# Patient Record
Sex: FEMALE | Race: BLACK OR AFRICAN AMERICAN | NOT HISPANIC OR LATINO
[De-identification: names, ages, dates, MRNs, and addresses within clinical notes are randomized per-mention and may not be internally consistent; named-entity substitution may affect disease eponyms.]

---

## 2023-04-11 ENCOUNTER — P2P PATIENT RECORD (OUTPATIENT)
Age: 43
End: 2023-04-11

## 2023-04-24 ENCOUNTER — LAB OUTSIDE AN ENCOUNTER (OUTPATIENT)
Dept: URBAN - METROPOLITAN AREA CLINIC 27 | Facility: CLINIC | Age: 43
End: 2023-04-24

## 2023-04-24 ENCOUNTER — OFFICE VISIT (OUTPATIENT)
Dept: URBAN - METROPOLITAN AREA CLINIC 26 | Facility: CLINIC | Age: 43
End: 2023-04-24
Payer: COMMERCIAL

## 2023-04-24 ENCOUNTER — WEB ENCOUNTER (OUTPATIENT)
Dept: URBAN - METROPOLITAN AREA CLINIC 27 | Facility: CLINIC | Age: 43
End: 2023-04-24

## 2023-04-24 ENCOUNTER — OFFICE VISIT (OUTPATIENT)
Dept: URBAN - METROPOLITAN AREA CLINIC 27 | Facility: CLINIC | Age: 43
End: 2023-04-24
Payer: COMMERCIAL

## 2023-04-24 VITALS
HEART RATE: 83 BPM | SYSTOLIC BLOOD PRESSURE: 138 MMHG | DIASTOLIC BLOOD PRESSURE: 100 MMHG | BODY MASS INDEX: 28.99 KG/M2 | HEIGHT: 65 IN | WEIGHT: 174 LBS

## 2023-04-24 DIAGNOSIS — K76.89 LIVER LESION: ICD-10-CM

## 2023-04-24 DIAGNOSIS — K59.09 CONSTIPATION: ICD-10-CM

## 2023-04-24 DIAGNOSIS — R14.0 BLOATING: ICD-10-CM

## 2023-04-24 DIAGNOSIS — R10.10 UPPER ABDOMINAL PAIN: ICD-10-CM

## 2023-04-24 PROCEDURE — 83014 H PYLORI DRUG ADMIN: CPT | Performed by: INTERNAL MEDICINE

## 2023-04-24 PROCEDURE — 99204 OFFICE O/P NEW MOD 45 MIN: CPT | Performed by: INTERNAL MEDICINE

## 2023-04-24 PROCEDURE — 99244 OFF/OP CNSLTJ NEW/EST MOD 40: CPT | Performed by: INTERNAL MEDICINE

## 2023-04-24 PROCEDURE — 83013 H PYLORI (C-13) BREATH: CPT | Performed by: INTERNAL MEDICINE

## 2023-04-24 RX ORDER — PANTOPRAZOLE SODIUM 20 MG/1
1 TABLET TABLET, DELAYED RELEASE ORAL ONCE A DAY
Status: ACTIVE | COMMUNITY

## 2023-04-24 NOTE — PHYSICAL EXAM GASTROINTESTINAL
Abdomen is soft, mild TTP upper abdomen, nondistended, no guarding or rigidity, no masses palpable, normal bowel sounds; no tympany

## 2023-04-24 NOTE — HPI-TODAY'S VISIT:
Patient the request of Dr. Rios for evaluation of abdominal "pressure" and bloating that have been intermittently present over the past month or so.  She denies any trigger for the onset of her symptoms.  She states that the pressure is worse in the upper abdomen.  It waxes and wanes without specific trigger.  She states that the bloating occurs after nearly any p.o. intake.  She does not take a probiotic or use anti-gas medication.  She has been seen in the ER twice over the past month for the same symptoms (and shortness of breath); CT abdomen pelvis apparently showed gastritis, uterine fibroids, and a 1.3 cm lesion in the liver (subcapsular, right lobe--?adenoma vs hemangioma).  Liver enzymes, lipase, WBC and hemoglobin were normal.  She was given Bentyl and Protonix 20 mg, and discharged home.  She was sent here for further evaluation.  She has been taking the 20 mg Protonix twice daily.  She states that this medication has been very helpful for her reflux but has not helped the abdominal pressure and discomfort.  She tried the Bentyl once; this was unhelpful.  She denies any food sensitivities.  She has rare constipation.  She has not lost any weight.  She denies significant anxiety.  She has not had a prior colonoscopy but underwent an upper endoscopy approximately 10 years ago which apparently showed "a sore in my stomach"; she states that she was treated with Nexium and ?antibiotics though she does not think it was for H. pylori.  There is no family history of colon cancer or polyps but her mother had liver cancer.

## 2023-04-25 ENCOUNTER — TELEPHONE ENCOUNTER (OUTPATIENT)
Dept: URBAN - METROPOLITAN AREA CLINIC 35 | Facility: CLINIC | Age: 43
End: 2023-04-25

## 2023-04-25 RX ORDER — OMEPRAZOLE MAGNESIUM, AMOXICILLIN AND RIFABUTIN 10; 250; 12.5 MG/1; MG/1; MG/1
4 CAPSULES CAPSULE, DELAYED RELEASE ORAL
Qty: 168 | Refills: 0 | OUTPATIENT
Start: 2023-04-25 | End: 2023-05-09

## 2023-04-27 ENCOUNTER — TELEPHONE ENCOUNTER (OUTPATIENT)
Dept: URBAN - METROPOLITAN AREA CLINIC 27 | Facility: CLINIC | Age: 43
End: 2023-04-27

## 2023-04-27 ENCOUNTER — TELEPHONE ENCOUNTER (OUTPATIENT)
Dept: URBAN - METROPOLITAN AREA CLINIC 35 | Facility: CLINIC | Age: 43
End: 2023-04-27

## 2023-04-27 ENCOUNTER — OFFICE VISIT (OUTPATIENT)
Dept: URBAN - METROPOLITAN AREA MEDICAL CENTER 8 | Facility: MEDICAL CENTER | Age: 43
End: 2023-04-27
Payer: COMMERCIAL

## 2023-04-27 DIAGNOSIS — K31.89 ACQUIRED DEFORMITY OF DUODENUM: ICD-10-CM

## 2023-04-27 DIAGNOSIS — R10.10 UPPER ABDOMINAL PAIN: ICD-10-CM

## 2023-04-27 DIAGNOSIS — R14.0 ABDOMINAL BLOATING: ICD-10-CM

## 2023-04-27 PROCEDURE — 43235 EGD DIAGNOSTIC BRUSH WASH: CPT | Performed by: INTERNAL MEDICINE

## 2023-04-27 RX ORDER — OMEPRAZOLE MAGNESIUM, AMOXICILLIN AND RIFABUTIN 10; 250; 12.5 MG/1; MG/1; MG/1
4 CAPSULES CAPSULE, DELAYED RELEASE ORAL
Qty: 168 | Refills: 0 | Status: ACTIVE | COMMUNITY
Start: 2023-04-25 | End: 2023-05-09

## 2023-04-27 RX ORDER — PANTOPRAZOLE SODIUM 20 MG/1
1 TABLET TABLET, DELAYED RELEASE ORAL ONCE A DAY
Status: ACTIVE | COMMUNITY

## 2023-05-01 ENCOUNTER — TELEPHONE ENCOUNTER (OUTPATIENT)
Dept: URBAN - METROPOLITAN AREA CLINIC 27 | Facility: CLINIC | Age: 43
End: 2023-05-01

## 2023-05-01 RX ORDER — AMOXICILLIN 500 MG/1
2 CAPSULES CAPSULE ORAL TWICE A DAY
Qty: 56 CAPSULE | Refills: 0 | OUTPATIENT
Start: 2023-05-01 | End: 2023-05-15

## 2023-05-01 RX ORDER — LANSOPRAZOLE 30 MG/1
1 CAPSULE CAPSULE, DELAYED RELEASE ORAL ONCE A DAY
Qty: 28 CAPSULE | Refills: 0 | OUTPATIENT
Start: 2023-05-01

## 2023-05-01 RX ORDER — CLARITHROMYCIN 500 MG/1
1 TABLET TABLET, FILM COATED ORAL
Qty: 28 TABLET | Refills: 0 | OUTPATIENT
Start: 2023-05-01 | End: 2023-05-15

## 2023-05-04 ENCOUNTER — OFFICE VISIT (OUTPATIENT)
Dept: URBAN - METROPOLITAN AREA SURGERY CENTER 7 | Facility: SURGERY CENTER | Age: 43
End: 2023-05-04

## 2023-06-21 ENCOUNTER — WEB ENCOUNTER (OUTPATIENT)
Dept: URBAN - METROPOLITAN AREA CLINIC 27 | Facility: CLINIC | Age: 43
End: 2023-06-21

## 2023-06-21 ENCOUNTER — OFFICE VISIT (OUTPATIENT)
Dept: URBAN - METROPOLITAN AREA CLINIC 27 | Facility: CLINIC | Age: 43
End: 2023-06-21

## 2023-06-28 ENCOUNTER — DASHBOARD ENCOUNTERS (OUTPATIENT)
Age: 43
End: 2023-06-28

## 2023-06-28 ENCOUNTER — OFFICE VISIT (OUTPATIENT)
Dept: URBAN - METROPOLITAN AREA CLINIC 27 | Facility: CLINIC | Age: 43
End: 2023-06-28
Payer: COMMERCIAL

## 2023-06-28 VITALS
WEIGHT: 181.2 LBS | BODY MASS INDEX: 30.19 KG/M2 | DIASTOLIC BLOOD PRESSURE: 87 MMHG | SYSTOLIC BLOOD PRESSURE: 120 MMHG | HEART RATE: 98 BPM | HEIGHT: 65 IN

## 2023-06-28 DIAGNOSIS — R10.10 UPPER ABDOMINAL PAIN: ICD-10-CM

## 2023-06-28 DIAGNOSIS — R14.0 BLOATING: ICD-10-CM

## 2023-06-28 PROCEDURE — 99214 OFFICE O/P EST MOD 30 MIN: CPT | Performed by: INTERNAL MEDICINE

## 2023-06-28 RX ORDER — PANTOPRAZOLE SODIUM 20 MG/1
1 TABLET TABLET, DELAYED RELEASE ORAL ONCE A DAY
Status: ACTIVE | COMMUNITY

## 2023-06-28 RX ORDER — HYOSCYAMINE SULFATE 0.12 MG/1
1 TABLET AS NEEDED TABLET ORAL
Qty: 180 TABLET | Refills: 0 | OUTPATIENT
Start: 2023-06-28 | End: 2023-07-28

## 2023-06-28 RX ORDER — LANSOPRAZOLE 30 MG/1
1 CAPSULE CAPSULE, DELAYED RELEASE ORAL ONCE A DAY
Qty: 28 CAPSULE | Refills: 0 | Status: ACTIVE | COMMUNITY
Start: 2023-05-01

## 2023-06-28 NOTE — HPI-TODAY'S VISIT:
Ms. Miguel Almendarez is a 42-year-old established patient who presents with intermittent 8/10 bilateral upper abd pain x 3 months. The duration of pain ranges from seconds to 10 minutes. No know known trigger. Resolves spontaneously. The pain does not disturb her sleep. She was recently seen in the ER at [facility]. Normal  chest-xray and abdominal ultrasound. Discharged with Sucralfate qid and Omeprazole 40 mg q day. She has chronic abdominal pressure and bloating. EGD in April was significant for several superficial erosions.

## 2023-07-27 ENCOUNTER — OFFICE VISIT (OUTPATIENT)
Dept: URBAN - METROPOLITAN AREA CLINIC 27 | Facility: CLINIC | Age: 43
End: 2023-07-27

## 2023-07-27 RX ORDER — LANSOPRAZOLE 30 MG/1
1 CAPSULE CAPSULE, DELAYED RELEASE ORAL ONCE A DAY
Qty: 28 CAPSULE | Refills: 0 | Status: ACTIVE | COMMUNITY
Start: 2023-05-01

## 2023-07-27 RX ORDER — PANTOPRAZOLE SODIUM 20 MG/1
1 TABLET TABLET, DELAYED RELEASE ORAL ONCE A DAY
Status: ACTIVE | COMMUNITY

## 2023-07-27 RX ORDER — HYOSCYAMINE SULFATE 0.12 MG/1
1 TABLET AS NEEDED TABLET ORAL
Qty: 180 TABLET | Refills: 0 | Status: ACTIVE | COMMUNITY
Start: 2023-06-28 | End: 2023-07-28